# Patient Record
Sex: MALE | Race: WHITE | NOT HISPANIC OR LATINO | Employment: OTHER | URBAN - METROPOLITAN AREA
[De-identification: names, ages, dates, MRNs, and addresses within clinical notes are randomized per-mention and may not be internally consistent; named-entity substitution may affect disease eponyms.]

---

## 2024-02-01 ENCOUNTER — HOSPITAL ENCOUNTER (OUTPATIENT)
Dept: PREADMISSION TESTING | Facility: OTHER | Age: 74
Discharge: HOME | End: 2024-02-01
Payer: MEDICARE

## 2024-02-05 ENCOUNTER — HOSPITAL ENCOUNTER (OUTPATIENT)
Dept: PREADMISSION TESTING | Facility: OTHER | Age: 74
Discharge: HOME | End: 2024-02-05
Attending: INTERNAL MEDICINE
Payer: MEDICARE

## 2024-02-05 VITALS
BODY MASS INDEX: 22.73 KG/M2 | HEIGHT: 68 IN | WEIGHT: 150 LBS | OXYGEN SATURATION: 99 % | RESPIRATION RATE: 18 BRPM | HEART RATE: 70 BPM | DIASTOLIC BLOOD PRESSURE: 67 MMHG | SYSTOLIC BLOOD PRESSURE: 116 MMHG | TEMPERATURE: 97 F

## 2024-02-05 DIAGNOSIS — Z01.818 PREOP TESTING: Primary | ICD-10-CM

## 2024-02-05 DIAGNOSIS — R93.1 ABNORMAL NUCLEAR CARDIAC IMAGING TEST: ICD-10-CM

## 2024-02-05 LAB
OHS QRS DURATION: 102 MS
OHS QTC CALCULATION: 434 MS

## 2024-02-05 PROCEDURE — 93005 ELECTROCARDIOGRAM TRACING: CPT

## 2024-02-05 PROCEDURE — 93010 ELECTROCARDIOGRAM REPORT: CPT | Mod: ,,, | Performed by: INTERNAL MEDICINE

## 2024-02-05 RX ORDER — ESCITALOPRAM OXALATE 10 MG/1
10 TABLET ORAL DAILY
COMMUNITY

## 2024-02-05 RX ORDER — LOSARTAN POTASSIUM 100 MG/1
100 TABLET ORAL DAILY
COMMUNITY

## 2024-02-05 NOTE — PRE ADMISSION SCREENING
Labs drawn and sent to lab in via tube system. Pneumatic tube sytem malfunctioning. I confirmed blood specimens were not received by lab and are no longer valid.

## 2024-02-05 NOTE — DISCHARGE INSTRUCTIONS
Information to Prepare you for your Surgery    PRE-ADMIT TESTING   [address] [facility]       Your surgery has been scheduled at [facility]. We are pleased to have the opportunity to serve you. For Further Information please call [phone].    On the day of surgery please report to the Information Desk on the 1st floor.    CONTACT YOUR PHYSICIAN'S OFFICE THE DAY PRIOR TO YOUR SURGERY TO OBTAIN YOUR ARRIVAL TIME.     The evening before surgery do not eat anything after midnight.      Why does your anesthesiologist allow you to drink Gatorade/Powerade before surgery?  Gatorade/Powerade helps to increase your comfort before surgery and to decrease your nausea after surgery.   The carbohydrates in Gatorade/Powerade help reduce your body's stress response to surgery.  If you are a diabetic-drink only water prior to surgery.    Outpatient Surgery- May allow 2 adults (18 and older)/ Support Persons (1 being the designated ) for all surgical/procedural patients. A breastfeeding mother will be allowed her infant and 2 adult Support Persons. No one under the age of 18 will be allowed in the building.      Angiogram Patients: Take medications as instructed by your physician, including aspirin with water.      Do Not Wear any make-up (especially eye make-up) to surgery. Please remove any false eyelashes or eyelash extensions. If you arrive the day of surgery with makeup/eyelashes on you will be required to remove prior to surgery. (There is a risk of corneal abrasions if eye makeup/eyelash extensions are not removed)    Leave all valuables at home.   Do Not wear any jewelry or watches, including any metal in body piercings. Jewelry must be removed prior to coming to the hospital.  There is a possibility that rings that are unable to be removed may be cut off if they are on the surgical extremity.    Please remove all hair extensions, wigs, clips and any other metal accessories/  ornaments from your hair.  These items may pose a flammable/fire risk in Surgery and must be removed.    Do not shave your surgical area at least 5 days prior to your surgery. The surgical prep will be performed at the hospital according to Infection Control regulations.    Contact Lens must be removed before surgery. Either do not wear the contact lens or bring a case and solution for storage.  Please bring a container for eyeglasses or dentures as required.  Bring any paperwork your physician has provided, such as consent forms,  history and physicals, doctor's orders, etc.   Bring comfortable clothes that are loose fitting to wear upon discharge. Take into consideration the type of surgery being performed.  Maintain your diet as advised per your physician the day prior to surgery.    Adequate rest the night before surgery is advised.   Park in the Parking lot behind the hospital or in the [facility] across the street from the parking lot. Parking is complimentary.  If you will be discharged the same day as your procedure, please arrange for a responsible adult to drive you home or to accompany you if traveling by taxi.   YOU WILL NOT BE PERMITTED TO DRIVE OR TO LEAVE THE HOSPITAL ALONE AFTER SURGERY.   If you are being discharged the same day, it is strongly recommended that you arrange for someone to remain with you for the first 24 hrs following your surgery.    The Surgeon will speak to your family/visitor after your surgery regarding the outcome of your surgery and post op care.  The Surgeon may speak to you after your surgery, but there is a possibility you may not remember the details.  Please check with your family members regarding the conversation with the Surgeon.    We strongly recommend whoever is bringing you home be present for discharge instructions.  This will ensure a thorough understanding for your post op home care.              Bathing Instructions with Hibiclens  Shower the  evening before and morning of your procedure with Chlorhexidine (Hibiclens)    Do not use Chlorhexidine on your face or genitals. Do not get in your eyes.  Wash your face with water and your regular face wash/soap  Use your regular shampoo  Apply Chlorhexidine (Hibiclens) directly on your skin or on a wet washcloth and wash gently. When showering: Move away from the shower stream when applying Chlorhexidine (Hibiclens) to avoid rinsing off too soon.  Rinse thoroughly with warm water  Do not dilute Chlorhexidine (Hibiclens)   Dry off as usual, do not use any deodorant, powder, body lotions, perfume, after shave or cologne.

## 2024-02-06 NOTE — PRE ADMISSION SCREENING
Jayde in Dr. Wells's office notified that pt's labs are stuck in malfunctioning pneumatic tube system. Understanding verbalized. May redraw AM of procedure.

## 2024-02-09 ENCOUNTER — HOSPITAL ENCOUNTER (OUTPATIENT)
Facility: OTHER | Age: 74
Discharge: OTHER INSTITUTION | End: 2024-02-09
Attending: INTERNAL MEDICINE | Admitting: INTERNAL MEDICINE
Payer: MEDICARE

## 2024-02-09 VITALS
WEIGHT: 150 LBS | HEART RATE: 60 BPM | TEMPERATURE: 97 F | DIASTOLIC BLOOD PRESSURE: 60 MMHG | OXYGEN SATURATION: 100 % | BODY MASS INDEX: 22.73 KG/M2 | RESPIRATION RATE: 19 BRPM | SYSTOLIC BLOOD PRESSURE: 158 MMHG | HEIGHT: 68 IN

## 2024-02-09 DIAGNOSIS — Z01.818 PREOP TESTING: Primary | ICD-10-CM

## 2024-02-09 DIAGNOSIS — R93.1 ABNORMAL NUCLEAR CARDIAC IMAGING TEST: ICD-10-CM

## 2024-02-09 PROBLEM — I25.10 CORONARY ARTERY DISEASE INVOLVING NATIVE CORONARY ARTERY OF NATIVE HEART WITHOUT ANGINA PECTORIS: Status: ACTIVE | Noted: 2024-02-09

## 2024-02-09 LAB
ALBUMIN SERPL BCP-MCNC: 3.6 G/DL (ref 3.5–5.2)
ALP SERPL-CCNC: 56 U/L (ref 55–135)
ALT SERPL W/O P-5'-P-CCNC: 28 U/L (ref 10–44)
ANION GAP SERPL CALC-SCNC: 9 MMOL/L (ref 8–16)
APTT PPP: 25.5 SEC (ref 21–32)
AST SERPL-CCNC: 26 U/L (ref 10–40)
BASOPHILS # BLD AUTO: 0.06 K/UL (ref 0–0.2)
BASOPHILS NFR BLD: 1.4 % (ref 0–1.9)
BILIRUB SERPL-MCNC: 0.5 MG/DL (ref 0.1–1)
BUN SERPL-MCNC: 21 MG/DL (ref 8–23)
CALCIUM SERPL-MCNC: 8.7 MG/DL (ref 8.7–10.5)
CHLORIDE SERPL-SCNC: 110 MMOL/L (ref 95–110)
CO2 SERPL-SCNC: 21 MMOL/L (ref 23–29)
CREAT SERPL-MCNC: 1.3 MG/DL (ref 0.5–1.4)
DIFFERENTIAL METHOD BLD: ABNORMAL
EOSINOPHIL # BLD AUTO: 0.2 K/UL (ref 0–0.5)
EOSINOPHIL NFR BLD: 5.1 % (ref 0–8)
ERYTHROCYTE [DISTWIDTH] IN BLOOD BY AUTOMATED COUNT: 14.6 % (ref 11.5–14.5)
EST. GFR  (NO RACE VARIABLE): 59 ML/MIN/1.73 M^2
GLUCOSE SERPL-MCNC: 103 MG/DL (ref 70–110)
HCT VFR BLD AUTO: 32.7 % (ref 40–54)
HGB BLD-MCNC: 10.5 G/DL (ref 14–18)
IMM GRANULOCYTES # BLD AUTO: 0.02 K/UL (ref 0–0.04)
IMM GRANULOCYTES NFR BLD AUTO: 0.5 % (ref 0–0.5)
INR PPP: 0.9 (ref 0.8–1.2)
LYMPHOCYTES # BLD AUTO: 1.7 K/UL (ref 1–4.8)
LYMPHOCYTES NFR BLD: 39.9 % (ref 18–48)
MCH RBC QN AUTO: 33.2 PG (ref 27–31)
MCHC RBC AUTO-ENTMCNC: 32.1 G/DL (ref 32–36)
MCV RBC AUTO: 104 FL (ref 82–98)
MONOCYTES # BLD AUTO: 0.8 K/UL (ref 0.3–1)
MONOCYTES NFR BLD: 19.1 % (ref 4–15)
NEUTROPHILS # BLD AUTO: 1.5 K/UL (ref 1.8–7.7)
NEUTROPHILS NFR BLD: 34 % (ref 38–73)
NRBC BLD-RTO: 0 /100 WBC
PLATELET # BLD AUTO: 229 K/UL (ref 150–450)
PMV BLD AUTO: 9.5 FL (ref 9.2–12.9)
POTASSIUM SERPL-SCNC: 4.4 MMOL/L (ref 3.5–5.1)
PROT SERPL-MCNC: 6.6 G/DL (ref 6–8.4)
PROTHROMBIN TIME: 10.3 SEC (ref 9–12.5)
RBC # BLD AUTO: 3.16 M/UL (ref 4.6–6.2)
SODIUM SERPL-SCNC: 140 MMOL/L (ref 136–145)
WBC # BLD AUTO: 4.29 K/UL (ref 3.9–12.7)

## 2024-02-09 PROCEDURE — C1769 GUIDE WIRE: HCPCS | Performed by: INTERNAL MEDICINE

## 2024-02-09 PROCEDURE — 63600175 PHARM REV CODE 636 W HCPCS: Performed by: INTERNAL MEDICINE

## 2024-02-09 PROCEDURE — 27201423 OPTIME MED/SURG SUP & DEVICES STERILE SUPPLY: Performed by: INTERNAL MEDICINE

## 2024-02-09 PROCEDURE — 93458 L HRT ARTERY/VENTRICLE ANGIO: CPT | Performed by: INTERNAL MEDICINE

## 2024-02-09 PROCEDURE — 85610 PROTHROMBIN TIME: CPT | Performed by: INTERNAL MEDICINE

## 2024-02-09 PROCEDURE — 85730 THROMBOPLASTIN TIME PARTIAL: CPT | Performed by: INTERNAL MEDICINE

## 2024-02-09 PROCEDURE — 85025 COMPLETE CBC W/AUTO DIFF WBC: CPT | Performed by: INTERNAL MEDICINE

## 2024-02-09 PROCEDURE — 36415 COLL VENOUS BLD VENIPUNCTURE: CPT | Performed by: INTERNAL MEDICINE

## 2024-02-09 PROCEDURE — C1894 INTRO/SHEATH, NON-LASER: HCPCS | Performed by: INTERNAL MEDICINE

## 2024-02-09 PROCEDURE — 99152 MOD SED SAME PHYS/QHP 5/>YRS: CPT | Performed by: INTERNAL MEDICINE

## 2024-02-09 PROCEDURE — 25000003 PHARM REV CODE 250: Performed by: INTERNAL MEDICINE

## 2024-02-09 PROCEDURE — C1760 CLOSURE DEV, VASC: HCPCS | Performed by: INTERNAL MEDICINE

## 2024-02-09 PROCEDURE — 80053 COMPREHEN METABOLIC PANEL: CPT | Performed by: INTERNAL MEDICINE

## 2024-02-09 RX ORDER — NITROGLYCERIN 0.4 MG/1
0.4 TABLET SUBLINGUAL EVERY 5 MIN PRN
Status: DISCONTINUED | OUTPATIENT
Start: 2024-02-09 | End: 2024-02-09 | Stop reason: HOSPADM

## 2024-02-09 RX ORDER — FENTANYL CITRATE 50 UG/ML
INJECTION, SOLUTION INTRAMUSCULAR; INTRAVENOUS
Status: DISCONTINUED | OUTPATIENT
Start: 2024-02-09 | End: 2024-02-09

## 2024-02-09 RX ORDER — NAPROXEN SODIUM 220 MG/1
162 TABLET, FILM COATED ORAL
Status: COMPLETED | OUTPATIENT
Start: 2024-02-09 | End: 2024-02-09

## 2024-02-09 RX ORDER — ACETAMINOPHEN 325 MG/1
650 TABLET ORAL EVERY 4 HOURS PRN
Status: DISCONTINUED | OUTPATIENT
Start: 2024-02-09 | End: 2024-02-09 | Stop reason: HOSPADM

## 2024-02-09 RX ORDER — SODIUM CHLORIDE 9 MG/ML
INJECTION, SOLUTION INTRAVENOUS CONTINUOUS
Status: ACTIVE | OUTPATIENT
Start: 2024-02-09 | End: 2024-02-09

## 2024-02-09 RX ORDER — LIDOCAINE HYDROCHLORIDE 10 MG/ML
INJECTION, SOLUTION EPIDURAL; INFILTRATION; INTRACAUDAL; PERINEURAL
Status: DISCONTINUED | OUTPATIENT
Start: 2024-02-09 | End: 2024-02-09

## 2024-02-09 RX ORDER — HYDROCODONE BITARTRATE AND ACETAMINOPHEN 10; 325 MG/1; MG/1
1 TABLET ORAL EVERY 4 HOURS PRN
Status: DISCONTINUED | OUTPATIENT
Start: 2024-02-09 | End: 2024-02-09 | Stop reason: HOSPADM

## 2024-02-09 RX ORDER — ONDANSETRON 8 MG/1
8 TABLET, ORALLY DISINTEGRATING ORAL EVERY 8 HOURS PRN
Status: DISCONTINUED | OUTPATIENT
Start: 2024-02-09 | End: 2024-02-09 | Stop reason: HOSPADM

## 2024-02-09 RX ORDER — LOSARTAN POTASSIUM 50 MG/1
100 TABLET ORAL DAILY
Status: DISCONTINUED | OUTPATIENT
Start: 2024-02-09 | End: 2024-02-09 | Stop reason: HOSPADM

## 2024-02-09 RX ORDER — NITROGLYCERIN 0.4 MG/1
0.4 TABLET SUBLINGUAL EVERY 5 MIN PRN
Status: DISCONTINUED | OUTPATIENT
Start: 2024-02-09 | End: 2024-02-09

## 2024-02-09 RX ORDER — ALUMINUM HYDROXIDE, MAGNESIUM HYDROXIDE, AND SIMETHICONE 1200; 120; 1200 MG/30ML; MG/30ML; MG/30ML
30 SUSPENSION ORAL
Status: DISCONTINUED | OUTPATIENT
Start: 2024-02-09 | End: 2024-02-09 | Stop reason: HOSPADM

## 2024-02-09 RX ORDER — DIAZEPAM 5 MG/1
5 TABLET ORAL
Status: COMPLETED | OUTPATIENT
Start: 2024-02-09 | End: 2024-02-09

## 2024-02-09 RX ORDER — SODIUM CHLORIDE 9 MG/ML
INJECTION, SOLUTION INTRAVENOUS CONTINUOUS
Status: DISCONTINUED | OUTPATIENT
Start: 2024-02-09 | End: 2024-02-09

## 2024-02-09 RX ORDER — ESCITALOPRAM OXALATE 10 MG/1
10 TABLET ORAL DAILY
Status: DISCONTINUED | OUTPATIENT
Start: 2024-02-09 | End: 2024-02-09 | Stop reason: HOSPADM

## 2024-02-09 RX ORDER — ATORVASTATIN CALCIUM 20 MG/1
40 TABLET, FILM COATED ORAL DAILY
Status: DISCONTINUED | OUTPATIENT
Start: 2024-02-09 | End: 2024-02-09 | Stop reason: HOSPADM

## 2024-02-09 RX ORDER — HEPARIN SOD,PORCINE/0.9 % NACL 1000/500ML
INTRAVENOUS SOLUTION INTRAVENOUS
Status: DISCONTINUED | OUTPATIENT
Start: 2024-02-09 | End: 2024-02-09

## 2024-02-09 RX ORDER — NAPROXEN SODIUM 220 MG/1
81 TABLET, FILM COATED ORAL
Status: DISCONTINUED | OUTPATIENT
Start: 2024-02-09 | End: 2024-02-09

## 2024-02-09 RX ORDER — DIPHENHYDRAMINE HYDROCHLORIDE 50 MG/ML
25 INJECTION INTRAMUSCULAR; INTRAVENOUS EVERY 6 HOURS PRN
Status: DISCONTINUED | OUTPATIENT
Start: 2024-02-09 | End: 2024-02-09 | Stop reason: HOSPADM

## 2024-02-09 RX ORDER — HYDROCODONE BITARTRATE AND ACETAMINOPHEN 5; 325 MG/1; MG/1
1 TABLET ORAL EVERY 4 HOURS PRN
Status: DISCONTINUED | OUTPATIENT
Start: 2024-02-09 | End: 2024-02-09 | Stop reason: HOSPADM

## 2024-02-09 RX ORDER — MIDAZOLAM HYDROCHLORIDE 1 MG/ML
INJECTION INTRAMUSCULAR; INTRAVENOUS
Status: DISCONTINUED | OUTPATIENT
Start: 2024-02-09 | End: 2024-02-09

## 2024-02-09 RX ORDER — DIPHENHYDRAMINE HCL 25 MG
25 CAPSULE ORAL
Status: COMPLETED | OUTPATIENT
Start: 2024-02-09 | End: 2024-02-09

## 2024-02-09 RX ADMIN — SODIUM CHLORIDE: 9 INJECTION, SOLUTION INTRAVENOUS at 06:02

## 2024-02-09 RX ADMIN — ASPIRIN 81 MG CHEWABLE TABLET 162 MG: 81 TABLET CHEWABLE at 06:02

## 2024-02-09 RX ADMIN — ATORVASTATIN CALCIUM 40 MG: 20 TABLET, FILM COATED ORAL at 01:02

## 2024-02-09 RX ADMIN — DIAZEPAM 5 MG: 5 TABLET ORAL at 06:02

## 2024-02-09 RX ADMIN — DIPHENHYDRAMINE HYDROCHLORIDE 25 MG: 25 CAPSULE ORAL at 06:02

## 2024-02-09 NOTE — NURSING
Patient received an ICU bed at [facility], report called to Pratibha. Informed Pratibha that patient's BP running 160's/90's and Dr. Wells aware, no new orders received.

## 2024-02-09 NOTE — PROGRESS NOTES
Mr. Reyes is a 73-year-old gentleman 1st seen in the office 3 months ago with complaints of weight loss anxiety and exertional breathlessness.  He had been depressed about family matters, and was drinking heavily each afternoon.  He lost about 20 lb in weight before he was placed on Lexapro and urged to discontinue his alcohol intake.  A stress test was done in the office this was strongly positive for ischemia in the 1st stage of Omar protocol, this was followed by a Cardiolite test which also was strongly positive for ischemia.  He was reluctant to be admitted early for angiography, and finally permitted me to admit him to the hospital for coronary angiography today.  Past history of hypertension.  Only medications he is on are losartan and Lexapro.    Coronary angiography shows a 95% distal left main coronary artery stenosis, a 99% ostial dominant left circumflex stenosis, a calcific proximal and mid left anterior descending with a tubular 90% midportion stenosis.  Non dominant right coronary artery with little left ventricular distribution.  Hypokinesia of the anterior wall and left ventricular apex with a moderate overall reduction in left ventricular contractility.    Discussed with Dr. Woods.   Plan transfer to [facility] for early aortocoronary bypass surgery.

## 2024-02-09 NOTE — Clinical Note
The catheter was inserted into the left ventricle. Hemodynamics were performed.  An angiography was performed of the LV.

## 2024-02-15 NOTE — DISCHARGE SUMMARY
[facility] ([place])  Cardiology  Discharge Summary      Patient Name: Casey Reyes    Admission Date: 2/9/2024    Discharge Date and Time: 2/9/24    Final Diagnoses:  Coronary artery disease   Principal Problem:  Coronary artery disease    HPI:  Mr. Reyes is a 73-year-old gentleman admitted with complaints of shortness of breath, had a strongly positive stress test in stage I, followed by a positive nuclear stress test.  Admitted for coronary angiography.    Impression on Admission:  Coronary artery disease    Hospital Course:  At angiography was noted to have a 95% distal left main coronary artery stenosis, a 99% ostial left circumflex stenosis, and a tubular calcific 90% mid left anterior descending coronary artery stenosis.  He had a non dominant right coronary artery.  Dr Woods was consulted, and he was transferred to [facility] for early aortocoronary bypass surgery.    Disposition:  Transferred to [facility]    Follow up/Patient Instructions:    Medications:     Medication List        ASK your doctor about these medications      ADVIL ORAL     EScitalopram oxalate 10 MG tablet  Commonly known as: LEXAPRO     losartan 100 MG tablet  Commonly known as: COZAAR             No discharge procedures on file.      Condition upon Discharge:  Stable          Gregg Wells MD

## 2024-12-23 ENCOUNTER — OFFICE VISIT (OUTPATIENT)
Dept: PSYCHIATRY | Facility: CLINIC | Age: 74
End: 2024-12-23
Payer: MEDICARE

## 2024-12-23 DIAGNOSIS — F10.20 ALCOHOL USE DISORDER, SEVERE, DEPENDENCE: Primary | ICD-10-CM

## 2024-12-23 PROCEDURE — 99211 OFF/OP EST MAY X REQ PHY/QHP: CPT | Mod: PBBFAC | Performed by: PSYCHIATRY & NEUROLOGY

## 2024-12-23 PROCEDURE — 99999 PR PBB SHADOW E&M-EST. PATIENT-LVL I: CPT | Mod: PBBFAC,,, | Performed by: PSYCHIATRY & NEUROLOGY

## 2024-12-23 PROCEDURE — 90792 PSYCH DIAG EVAL W/MED SRVCS: CPT | Mod: ,,, | Performed by: PSYCHIATRY & NEUROLOGY

## 2024-12-23 RX ORDER — ASPIRIN 81 MG/1
81 TABLET ORAL DAILY
COMMUNITY
Start: 2024-02-17

## 2024-12-23 RX ORDER — TRAZODONE HYDROCHLORIDE 150 MG/1
150 TABLET ORAL NIGHTLY PRN
COMMUNITY
Start: 2024-12-17

## 2024-12-23 RX ORDER — ATORVASTATIN CALCIUM 10 MG/1
10 TABLET, FILM COATED ORAL DAILY
COMMUNITY
Start: 2024-02-17

## 2024-12-23 NOTE — PROGRESS NOTES
"Outpatient Psychiatry Initial Visit (MD/NP)    12/23/2024    Casey Reyes, a 74 y.o. male, presenting for initial evaluation visit. Met with patient.    Reason for Encounter: Referral from friend YAJAIRA Barksdale  . Patient complains of   Chief Complaint   Patient presents with    Depression   .      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      Great nephew of Koko Reyes   Son lives nearby [place]  3 kids 4, 7 , 9  Duck lease washed away in [facility]   He and wife ( smoker)  enjoy [place] ( wife to  spa ; he and wife  slots )   Wife employed [employer]  - loves it   Former cigar smoker   Has long time  [employer] [place] the [place] - drinks there   Other camp on [facility]       Stressor :   Dtr, Mary Jo ,  bought  her a house for them to use nearby that she left  about 7 yrs ago - 2 kids ages 7yo and  15 yo dtr ( [place]) - now in [facility]   Preg at [employer] from [facility] male. His mo has 3 divorces and MS . Dtr close to mom which complicated baby father as he is controlling .  He and wife raised the girl x 1st 3 yrs with Mary Jo . Then they built them a house nearby as dtr was educated then employed at [employer] .   Son in law  ( marilu - brain cancer ) juhas an irritable temper who was gone for much time in past for which pt 's wife considered calling authorities .    > 2yrs to take a "vacation "  from them       History of Present Illness: Substance Abuse (ETOH):  No alc x 2 weeks . Patient admits to consuming vodka alcohol 7 days a week, averaging 3 double drinks " per day. The maximum number of drinks consumed on a given day is 5.  Patient has not failed to fulfill major role obligations at home.  Patient denies putting others in dangerous situations and denies having legal problems as a result of substance abuse.  Patient has not been arrested for driving while intoxicated. Get s lethargic . Has driven drunk such as 2 weeks ago .     Drinking has increased over the last 2 years and sedentary .     Substance Use Disorder Criteria:  Often take in larger amounts or over a longer period of time than was intended: yes  Persistent desire or unsuccessful efforts to cut down or control use: yes , up to 8 months sober ; no lent give up   Great deal of time spent in activities necessary to obtain substance, use, or recover from effects: yes  Craving/strong desire for substance or urge to use: yes   Use resulting in failure to fulfill major role obligations at home, work or school: to some degree at home ; never in working days   Social, occupational, recreational activities decreased because of use: yes to fishing   Continued use despite having persistent or recurrent social or interpersonal problems cause or exaserbated by the substance: yes  but wife would like him to drink socially   Recurrent use in situations in which it is physically hazardous: no   Use despite physical or psychological problems that are likely to have been caused or exacerbated by the substance: Yes -   Tolerance: No  Withdrawal: Yes - tremors       Mild (1-3), Moderate (4-5), Severe (>=6)         Psychiatric Review Of Systems - Is patient experiencing or having changes in:  sleep: yes , initial insomnia - cautioned about priapism   appetite: loses with alcohol withdrawal   weight: no dec 2024- 150 ( past 162 base)   energy/anergy: low  4 of 10     interest/pleasure/anhedonia: yes   somatic symptoms: no    anxiety/panic: worry based, no recent panic attacks ; past driving [place] anxiety   guilty/hopelessness:  yes   concentration: no crossword 3-4 times per week   S.I.B.s/risky behavior: no  Irritability: no  Racing thoughts: no  Impulsive behaviors: no  Paranoia: no  AVH: no           Review Of Systems:     GENERAL:  No weight gain or loss  SKIN:  No rashes or lacerations  HEAD:  No headaches  EYES:  No exophthalmos, jaundice or blindness  EARS:  No dizziness, tinnitus or hearing loss  NOSE:  No changes in smell  MOUTH & THROAT:  No dyskinetic movements or obvious goiter  CHEST:  No shortness of breath, hyperventilation or cough  CARDIOVASCULAR:  No tachycardia or chest pain  ABDOMEN:  No nausea, vomiting, pain, constipation or diarrhea  URINARY:  No frequency, dysuria or sexual dysfunction  ENDOCRINE:  No polydipsia, polyuria  MUSCULOSKELETAL:  No pain or stiffness of the joints  NEUROLOGIC:  No weakness, sensory changes, seizures, confusion, memory loss, tremor or other abnormal movements    Current Evaluation:     Nutritional Screening: Considering the patient's height and weight, medications, medical history and preferences, should a referral be made to the dietitian? no    Constitutional  Vitals:  Most recent vital signs, dated less than 90 days prior to this appointment, were reviewed.    There were no vitals filed for this visit.     General:  unremarkable, age appropriate, casually dressed, neatly groomed     Musculoskeletal  Muscle Strength/Tone:  no dyskinesia, no dystonia, no tremor   Gait & Station:  non-ataxic     Psychiatric  Speech:  no latency; no press, spontaneous   Mood & Affect:  anxious  congruent and appropriate, mood-congruent   Thought Process:  normal and logical, goal-directed   Associations:  intact   Thought Content:  normal, no suicidality, no homicidality, delusions, or paranoia   Insight:  has awareness of illness   Judgement: behavior is adequate to circumstances   Orientation:  grossly intact   Memory: intact for content of interview   Language: grossly intact   Attention Span &  Concentration:  able to focus   Fund of Knowledge:  intact and appropriate to age and level of education       Relevant Elements of Neurological Exam: normal gait    Functioning in Relationships:  Spouse/partner:    Peers: has support of friends   Employers: retired     Laboratory Data  No visits with results within 1 Month(s) from this visit.   Latest known visit with results is:   Admission on 02/09/2024, Discharged on 02/09/2024   Component Date Value Ref Range Status    WBC 02/09/2024 4.29  3.90 - 12.70 K/uL Final    RBC 02/09/2024 3.16 (L)  4.60 - 6.20 M/uL Final    Hemoglobin 02/09/2024 10.5 (L)  14.0 - 18.0 g/dL Final    Hematocrit 02/09/2024 32.7 (L)  40.0 - 54.0 % Final    MCV 02/09/2024 104 (H)  82 - 98 fL Final    MCH 02/09/2024 33.2 (H)  27.0 - 31.0 pg Final    MCHC 02/09/2024 32.1  32.0 - 36.0 g/dL Final    RDW 02/09/2024 14.6 (H)  11.5 - 14.5 % Final    Platelets 02/09/2024 229  150 - 450 K/uL Final    MPV 02/09/2024 9.5  9.2 - 12.9 fL Final    Immature Granulocytes 02/09/2024 0.5  0.0 - 0.5 % Final    Gran # (ANC) 02/09/2024 1.5 (L)  1.8 - 7.7 K/uL Final    Immature Grans (Abs) 02/09/2024 0.02  0.00 - 0.04 K/uL Final    Lymph # 02/09/2024 1.7  1.0 - 4.8 K/uL Final    Mono # 02/09/2024 0.8  0.3 - 1.0 K/uL Final    Eos # 02/09/2024 0.2  0.0 - 0.5 K/uL Final    Baso # 02/09/2024 0.06  0.00 - 0.20 K/uL Final    nRBC 02/09/2024 0  0 /100 WBC Final    Gran % 02/09/2024 34.0 (L)  38.0 - 73.0 % Final    Lymph % 02/09/2024 39.9  18.0 - 48.0 % Final    Mono % 02/09/2024 19.1 (H)  4.0 - 15.0 % Final    Eosinophil % 02/09/2024 5.1  0.0 - 8.0 % Final    Basophil % 02/09/2024 1.4  0.0 - 1.9 % Final    Differential Method 02/09/2024 Automated   Final    Sodium 02/09/2024 140  136 - 145 mmol/L Final    Potassium 02/09/2024 4.4  3.5 - 5.1 mmol/L Final    Chloride 02/09/2024 110  95 - 110 mmol/L Final    CO2 02/09/2024 21 (L)  23 - 29 mmol/L Final    Glucose 02/09/2024 103  70 - 110 mg/dL Final    BUN 02/09/2024  21  8 - 23 mg/dL Final    Creatinine 02/09/2024 1.3  0.5 - 1.4 mg/dL Final    Calcium 02/09/2024 8.7  8.7 - 10.5 mg/dL Final    Total Protein 02/09/2024 6.6  6.0 - 8.4 g/dL Final    Albumin 02/09/2024 3.6  3.5 - 5.2 g/dL Final    Total Bilirubin 02/09/2024 0.5  0.1 - 1.0 mg/dL Final    Alkaline Phosphatase 02/09/2024 56  55 - 135 U/L Final    AST 02/09/2024 26  10 - 40 U/L Final    ALT 02/09/2024 28  10 - 44 U/L Final    eGFR 02/09/2024 59 (A)  >60 mL/min/1.73 m^2 Final    Anion Gap 02/09/2024 9  8 - 16 mmol/L Final    aPTT 02/09/2024 25.5  21.0 - 32.0 sec Final    Prothrombin Time 02/09/2024 10.3  9.0 - 12.5 sec Final    INR 02/09/2024 0.9  0.8 - 1.2 Final         Medications  Outpatient Encounter Medications as of 12/23/2024   Medication Sig Dispense Refill    EScitalopram oxalate (LEXAPRO) 10 MG tablet Take 10 mg by mouth once daily.      ibuprofen (ADVIL ORAL) Take by mouth as needed.      losartan (COZAAR) 100 MG tablet Take 100 mg by mouth once daily.       No facility-administered encounter medications on file as of 12/23/2024.           Assessment - Diagnosis - Goals:     Impression: new to me with alcohol concerns       ICD-10-CM ICD-9-CM   1. Alcohol use disorder, severe, dependence  F10.20 303.90       Strengths and Liabilities: educated ,  , has support of friends     Treatment Goals:  Specify outcomes written in observable, behavioral terms:   Drug & Alcohol: continue sobriety     Treatment Plan/Recommendations:   Medication Management: The risks and benefits of medication were discussed with the patient.  AA/NA/CA/ACOA/Abstinence  Referral for further treatment to consider IOP - did not tour bc he other engagement ; aleksandra call after 1/1/25  The treatment plan and follow up plan were reviewed with the patient.      Return to Clinic: 2 weeks

## (undated) DEVICE — DEVICE MYNX GRIP 6/7F

## (undated) DEVICE — CATH DXTERITY JL40 100CM 6FR

## (undated) DEVICE — BAG SNAP KOVER BAND 36 X 28 IN

## (undated) DEVICE — TRAY ANGIO BAPTIST

## (undated) DEVICE — INTRODUCER CATH 6F 11CM

## (undated) DEVICE — GUIDEWIRE SAFE T .035IN 180CM

## (undated) DEVICE — OMNIPAQUE 350MG 150ML VIAL

## (undated) DEVICE — CATH IMPULSE D6F PIG 5PK

## (undated) DEVICE — CATH DXTERITY JR40 100CM 6FR